# Patient Record
Sex: MALE | Race: WHITE | NOT HISPANIC OR LATINO | Employment: FULL TIME | URBAN - NONMETROPOLITAN AREA
[De-identification: names, ages, dates, MRNs, and addresses within clinical notes are randomized per-mention and may not be internally consistent; named-entity substitution may affect disease eponyms.]

---

## 2017-04-21 ENCOUNTER — OFFICE VISIT (OUTPATIENT)
Dept: MEDICAL GROUP | Facility: PHYSICIAN GROUP | Age: 47
End: 2017-04-21
Payer: COMMERCIAL

## 2017-04-21 ENCOUNTER — TELEPHONE (OUTPATIENT)
Dept: MEDICAL GROUP | Facility: PHYSICIAN GROUP | Age: 47
End: 2017-04-21

## 2017-04-21 VITALS
HEART RATE: 68 BPM | TEMPERATURE: 96.9 F | HEIGHT: 76 IN | OXYGEN SATURATION: 97 % | SYSTOLIC BLOOD PRESSURE: 122 MMHG | BODY MASS INDEX: 29.1 KG/M2 | RESPIRATION RATE: 16 BRPM | DIASTOLIC BLOOD PRESSURE: 82 MMHG | WEIGHT: 239 LBS

## 2017-04-21 DIAGNOSIS — L98.9 SKIN LESION: ICD-10-CM

## 2017-04-21 DIAGNOSIS — I10 ESSENTIAL HYPERTENSION: ICD-10-CM

## 2017-04-21 DIAGNOSIS — R73.09 ELEVATED GLUCOSE: ICD-10-CM

## 2017-04-21 DIAGNOSIS — Z00.00 HEALTH CARE MAINTENANCE: ICD-10-CM

## 2017-04-21 DIAGNOSIS — E03.9 HYPOTHYROIDISM (ACQUIRED): ICD-10-CM

## 2017-04-21 PROCEDURE — 99214 OFFICE O/P EST MOD 30 MIN: CPT | Performed by: NURSE PRACTITIONER

## 2017-04-21 RX ORDER — LOSARTAN POTASSIUM 25 MG/1
25 TABLET ORAL DAILY
Qty: 90 TAB | Refills: 3 | Status: SHIPPED | OUTPATIENT
Start: 2017-04-21

## 2017-04-21 RX ORDER — LEVOTHYROXINE SODIUM 0.05 MG/1
50 TABLET ORAL
Qty: 90 TAB | Refills: 3 | Status: SHIPPED | OUTPATIENT
Start: 2017-04-21 | End: 2018-01-17 | Stop reason: SDUPTHER

## 2017-04-21 NOTE — ASSESSMENT & PLAN NOTE
This is a chronic condition, stable and controlled on lisinopril 10 mg daily. His blood pressure today is 122/82. He denies symptoms of hypertension. He is also on this for renal protection after kidney stones. However, he does not tolerate this medication very well due to side effects including cough. He is wanting to know if there is anything else he can take or if he can decrease the dose to 5 mg daily. We will change him over to losartan, 25 mg daily. Patient was agreeable to this plan. He is to continue with healthy eating, regular physical activity and continued efforts towards weight loss.

## 2017-04-21 NOTE — PATIENT INSTRUCTIONS
Change from lisinopril to losartan 25 mg daily--let's see how you do.    Called in SynthBigDeal, and told pharmacy to dispense name brand only    Have labs done in Sept, after the 24th

## 2017-04-21 NOTE — PROGRESS NOTES
"Chief Complaint   Patient presents with   • Results     fv labs, discuss levothyroxine         This is a 47 y.o.male patient that presents today with the following: Follow-up visit, acute and chronic conditions    Skin lesion  Pt has multiple skin lesions on back that are concerning, they are irregular in shape. Wife has noticed that some of them have changed over the years and there is one on his L scapula that bothers him. Upon examination, patient had multiple skin lesions that appear to be seborrheic keratoses, however he does have one that is half a centimeter in diameter  That is a red and raised nodule on his left scapula that is erythematous, soft--this is the one that bothers him the most.   He is fair skinned and does admit to having a significant sunburn and his adolescent years and would benefit from an overall skin evaluation, thus referral to dermatology was placed.    Hypothyroidism (acquired)  This is a chronic condition, stable and controlled on Synthroid 50 µg daily. His last TSH in September was within normal limits, but on the high end of normal. He has been taking the generic form of this, but states he does not tolerate it very well. He feels that his eyes are dry and almost like they are \"bulging.\" We will call in Synthroid to his pharmacy indicating for them to fill only if the name brand. He'll be due for labs in September, these were ordered. He is to have these done before he follows up with me in October. Otherwise, he does not complain of symptoms of hypothyroidism.    Essential hypertension  This is a chronic condition, stable and controlled on lisinopril 10 mg daily. His blood pressure today is 122/82. He denies symptoms of hypertension. He is also on this for renal protection after kidney stones. However, he does not tolerate this medication very well due to side effects including cough. He is wanting to know if there is anything else he can take or if he can decrease the dose to 5 mg " daily. We will change him over to losartan, 25 mg daily. Patient was agreeable to this plan. He is to continue with healthy eating, regular physical activity and continued efforts towards weight loss.    Health care maintenance  Patient is up-to-date with health care maintenance. He will be due for routine fasting labs in the fall, these were ordered and he is to have these done before he sees me at his follow-up appointment in October. He is up-to-date with immunizations. He will be due for his first lung cancer screening exam next year when he turns 48.    Elevated glucose  With patient's labs in September, it was noted that his glucose is 136. At that time a hemoglobin A1c was ordered and he was to have that done before his appointment today. He did not have this done, this was reordered and I would like him to have this done before his appointment with me in the fall.        No visits with results within 1 Month(s) from this visit.  Latest known visit with results is:    Hospital Outpatient Visit on 09/24/2016   Component Date Value   • Sodium 09/24/2016 138    • Potassium 09/24/2016 4.0    • Chloride 09/24/2016 105    • Co2 09/24/2016 26    • Anion Gap 09/24/2016 7.0    • Glucose 09/24/2016 136*   • Bun 09/24/2016 11    • Creatinine 09/24/2016 0.92    • Calcium 09/24/2016 8.9    • AST(SGOT) 09/24/2016 29    • ALT(SGPT) 09/24/2016 41    • Alkaline Phosphatase 09/24/2016 52    • Total Bilirubin 09/24/2016 0.8    • Albumin 09/24/2016 4.5    • Total Protein 09/24/2016 7.4    • Globulin 09/24/2016 2.9    • A-G Ratio 09/24/2016 1.6    • Cholesterol,Tot 09/24/2016 180    • Triglycerides 09/24/2016 169*   • HDL 09/24/2016 44    • LDL 09/24/2016 102*   • TSH 09/24/2016 3.140    • GFR If  09/24/2016 >60    • GFR If Non  Ameri* 09/24/2016 >60          clinical course has been stable    Past Medical History   Diagnosis Date   • Hypertension    • Hypothyroidism (acquired)    • Chronic sinusitis   "      Past Surgical History   Procedure Laterality Date   • Rhinoplasty  2000   • Nasal reconstruction  2013   • Hand surgery  1997, 1998       Family History   Problem Relation Age of Onset   • Diabetes Father    • Hypertension Father    • Thyroid Mother    • Kidney stones Father      Gout as well   • Hypertension Mother        Pcn    Current Outpatient Prescriptions Ordered in Psychiatric   Medication Sig Dispense Refill   • losartan (COZAAR) 25 MG Tab Take 1 Tab by mouth every day. 90 Tab 3   • levothyroxine (SYNTHROID) 50 MCG Tab Take 1 Tab by mouth Every morning on an empty stomach. 90 Tab 3   • albuterol 108 (90 BASE) MCG/ACT Aero Soln inhalation aerosol Inhale 2 Puffs by mouth every 6 hours as needed for Shortness of Breath. 8.5 g 3   • fluticasone (FLONASE) 50 MCG/ACT nasal spray Spray 1 Spray in nose 2 times a day. 1 Bottle 0     No current Epic-ordered facility-administered medications on file.       Constitutional ROS: No unexpected change in weight, No weakness, No unexplained fevers, sweats, or chills  Pulmonary ROS: No chronic cough, sputum, or hemoptysis, No shortness of breath, No recent change in breathing  Cardiovascular ROS: No shortness of breath, No edema, No palpitations, Positive for hypertension, well-controlled  Gastrointestinal ROS: No abdominal pain, No nausea, vomiting, diarrhea, or constipation, no blood in stool  Musculoskeletal/Extremities ROS: No clubbing, No cyanosis, No pain, redness or swelling on the joints  Skin/Integumentary ROS: Positive per history of present illness  Neurologic ROS: Normal development, No seizures, No weakness    Physical exam:  /82 mmHg  Pulse 68  Temp(Src) 36.1 °C (96.9 °F)  Resp 16  Ht 1.93 m (6' 3.98\")  Wt 108.41 kg (239 lb)  BMI 29.10 kg/m2  SpO2 97%  General Appearance: Middle-aged male, alert, no distress, moderately overweight, well-groomed   Skin: Positive for multiple seborrheic keratoses of varying size and color to the back, half millimeter " raised, red nodule to left scapula  Lungs: negative findings: normal respiratory rate and rhythm, lungs clear to auscultation  Heart: negative. RRR without murmur, gallop, or rubs.  No ectopy.  Abdomen: Abdomen soft, non-tender. BS normal. No masses,  No organomegaly  Musculoskeletal: negative  Neuro: Intact, Oriented, appropriate, judgment intact. Cranial nerves II-12 grossly intact    Medical decision making/discussion: Patient here for follow-up visit. Referral was placed to dermatology for overall skin check and multiple lesions to back. He is to have labs done in the fall before he sees me in follow-up. Medications were refilled and blood pressure medication was changed from lisinopril to losartan. He is to work on healthy diet, regular physical activity and continued efforts towards weight loss. We will check a hemoglobin A1c for elevated blood glucose.    Patrick was seen today for results.    Diagnoses and all orders for this visit:    Essential hypertension  -     losartan (COZAAR) 25 MG Tab; Take 1 Tab by mouth every day.  -     COMP METABOLIC PANEL; Future  -     LIPID PROFILE; Future    Hypothyroidism (acquired)  -     levothyroxine (SYNTHROID) 50 MCG Tab; Take 1 Tab by mouth Every morning on an empty stomach.  -     TSH WITH REFLEX TO FT4; Future    Skin lesion  -     REFERRAL TO DERMATOLOGY    Health care maintenance    Elevated glucose  -     HEMOGLOBIN A1C; Future          Please note that this dictation was created using voice recognition software. I have made every reasonable attempt to correct obvious errors, but I expect that there are errors of grammar and possibly content that I did not discover before finalizing the note.

## 2017-04-21 NOTE — ASSESSMENT & PLAN NOTE
Patient is up-to-date with health care maintenance. He will be due for routine fasting labs in the fall, these were ordered and he is to have these done before he sees me at his follow-up appointment in October. He is up-to-date with immunizations. He will be due for his first lung cancer screening exam next year when he turns 48.

## 2017-04-21 NOTE — ASSESSMENT & PLAN NOTE
Pt has multiple skin lesions on back that are concerning, they are irregular in shape. Wife has noticed that some of them have changed over the years and there is one on his L scapula that bothers him. Upon examination, patient had multiple skin lesions that appear to be seborrheic keratoses, however he does have one that is half a centimeter in diameter  That is a red and raised nodule on his left scapula that is erythematous, soft--this is the one that bothers him the most.   He is fair skinned and does admit to having a significant sunburn and his adolescent years and would benefit from an overall skin evaluation, thus referral to dermatology was placed.

## 2017-04-21 NOTE — MR AVS SNAPSHOT
"        Patrick Robertscheyenne   2017 8:00 AM   Office Visit   MRN: [id number]    Department:  [facility]   Dept Phone:  [phone]    Description:  Male : 1970   Provider:  DAVION Mcginnis           Reason for Visit     Results fv labs, discuss levothyroxine      Allergies as of 2017     Allergen Noted Reactions    Pcn [Penicillins] 2016   Anaphylaxis      You were diagnosed with     Essential hypertension   [6344487]       Hypothyroidism (acquired)   [906010]       Skin lesion   [428448]         Vital Signs     Blood Pressure Pulse Temperature Respirations Height Weight    122/82 mmHg 68 36.1 °C (96.9 °F) 16 1.93 m (6' 3.98\") 108.41 kg (239 lb)    Body Mass Index Oxygen Saturation Smoking Status             29.10 kg/m2 97% Former Smoker         Basic Information     Date Of Birth Sex Race Ethnicity Preferred Language    1970 Male White Non- English      Your appointments     Oct 06, 2017  6:15 AM   Adult Draw/Collection with LAB [facility]   [facility] (--)    [address] [place] [address] [address] [place] [address]   [phone]            Oct 20, 2017  9:20 AM   Established Patient with DAVION Mcginins   [address] [place] [id number] [phone]           You will be receiving a confirmation call a few days before your appointment from our automated call confirmation system.              Problem List              ICD-10-CM Priority Class Noted - Resolved    Allergic rhinitis due to pollen J30.1   2016 - Present    Chronic sinusitis J32.9   2016 - Present    Shortness of breath R06.02   2016 - Present    Hypothyroidism (acquired) E03.9   2016 - Present    Essential hypertension I10   2016 - Present    Skin lesion L98.9   2017 - Present      Health Maintenance        Date Due Completion Dates    COLON CANCER SCREENING ANNUAL FIT 2017 (Postponed), 2016 " (Postponed)    Override on 9/23/2016: Postponed (not due until age 50)    Override on 9/23/2016: Postponed (not  due unit 50 years old)    IMM DTaP/Tdap/Td Vaccine (2 - Td) 4/23/2026 4/23/2016            Current Immunizations     Tdap Vaccine 4/23/2016      Below and/or attached are the medications your provider expects you to take. Review all of your home medications and newly ordered medications with your provider and/or pharmacist. Follow medication instructions as directed by your provider and/or pharmacist. Please keep your medication list with you and share with your provider. Update the information when medications are discontinued, doses are changed, or new medications (including over-the-counter products) are added; and carry medication information at all times in the event of emergency situations     Allergies:  PCN - Anaphylaxis               Medications  Valid as of: April 21, 2017 -  8:28 AM    Generic Name Brand Name Tablet Size Instructions for use    Albuterol Sulfate (Aero Soln) albuterol 108 (90 BASE) MCG/ACT Inhale 2 Puffs by mouth every 6 hours as needed for Shortness of Breath.        Fluticasone Propionate (Suspension) FLONASE 50 MCG/ACT Spray 1 Spray in nose 2 times a day.        Levothyroxine Sodium (Tab) SYNTHROID 50 MCG Take 1 Tab by mouth Every morning on an empty stomach.        Losartan Potassium (Tab) COZAAR 25 MG Take 1 Tab by mouth every day.        .                 Medicines prescribed today were sent to:     [facility] [id number] [facility] [address] AT [facility] [address] & [address] [facility] [address]    Phone: [phone] Fax: [phone]    Open 24 Hours?: No      Medication refill instructions:       If your prescription bottle indicates you have medication refills left, it is not necessary to call your provider’s office. Please contact your pharmacy and they will refill your medication.    If your prescription bottle indicates  you do not have any refills left, you may request refills at any time through one of the following ways: The online [employer] system (except Urgent Care), by calling your provider’s office, or by asking your pharmacy to contact your provider’s office with a refill request. Medication refills are processed only during regular business hours and may not be available until the next business day. Your provider may request additional information or to have a follow-up visit with you prior to refilling your medication.   *Please Note: Medication refills are assigned a new Rx number when refilled electronically. Your pharmacy may indicate that no refills were authorized even though a new prescription for the same medication is available at the pharmacy. Please request the medicine by name with the pharmacy before contacting your provider for a refill.        Your To Do List     Future Labs/Procedures Complete By Expires    COMP METABOLIC PANEL  As directed 4/21/2018    LIPID PROFILE  As directed 4/21/2018    TSH WITH REFLEX TO FT4  As directed 4/21/2018      Referral     A referral request has been sent to our patient care coordination department. Please allow 3-5 business days for us to process this request and contact you either by phone or mail. If you do not hear from us by the 5th business day, please call us at [phone].        Instructions    Change from lisinopril to losartan 25 mg daily--let's see how you do.    Called in Synthroid, and told pharmacy to dispense name brand only    Have labs done in Sept, after the 24th              [employer] Access Code: [email or web address]  Expires: 5/21/2017  8:17 AM    [employer]  A secure, online tool to manage your health information     [employer]’s [employer]® is a secure, online tool that connects you to your personalized health information from the privacy of your home -- day or night - making it very easy for you to manage your healthcare. Once the activation process is  completed, you can even access your medical information using the Alaris cali, which is available for free in the Apple Cali store or Google Play store.     Alaris provides the following levels of access (as shown below):   My Chart Features   [facility] Doctor [facility]  Specialists [facility]  Urgent  Care Non-[facility]  Primary Care  Doctor   Email your healthcare team securely and privately 24/7 X X X    Manage appointments: schedule your next appointment; view details of past/upcoming appointments X      Request prescription refills. X      View recent personal medical records, including lab and immunizations X X X X   View health record, including health history, allergies, medications X X X X   Read reports about your outpatient visits, procedures, consult and ER notes X X X X   See your discharge summary, which is a recap of your hospital and/or ER visit that includes your diagnosis, lab results, and care plan. X X       How to register for Alaris:  1. Go to  [email or web address].  2. Click on the Sign Up Now box, which takes you to the New Member Sign Up page. You will need to provide the following information:  a. Enter your Alaris Access Code exactly as it appears at the top of this page. (You will not need to use this code after you’ve completed the sign-up process. If you do not sign up before the expiration date, you must request a new code.)   b. Enter your date of birth.   c. Enter your home email address.   d. Click Submit, and follow the next screen’s instructions.  3. Create a Alaris ID. This will be your Alaris login ID and cannot be changed, so think of one that is secure and easy to remember.  4. Create a Alaris password. You can change your password at any time.  5. Enter your Password Reset Question and Answer. This can be used at a later time if you forget your password.   6. Enter your e-mail address. This allows you to receive e-mail notifications when new information is  available in [employer] Computing. Click Sign Up. You can now view your health information.    For assistance activating your [employer] account, call [phone]        Quit Tobacco Information     Do you want to quit using tobacco?    Quitting tobacco decreases risks of cancer, heart and lung disease, increases life expectancy, improves sense of taste and smell, and increases spending money, among other benefits.    If you are thinking about quitting, we can help.  • [facility] Quit Tobacco Program: 804.986.6062  o Program occurs weekly for four weeks and includes pharmacist consultation on products to support quitting smoking or chewing tobacco. A provider referral is needed for pharmacist consultation.  • Tobacco Users Help Hotline: 9-800QUIT-NOW (541-7840) or https://[place].quitlogix.org/  o Free, confidential telephone and online coaching for [place] residents. Sessions are designed on a schedule that is convenient for you. Eligible clients receive free nicotine replacement therapy.  • Nationally: www.smokefree.gov  o Information and professional assistance to support both immediate and long-term needs as you become, and remain, a non-smoker. Smokefree.gov allows you to choose the help that best fits your needs.

## 2017-04-21 NOTE — ASSESSMENT & PLAN NOTE
"This is a chronic condition, stable and controlled on Synthroid 50 µg daily. His last TSH in September was within normal limits, but on the high end of normal. He has been taking the generic form of this, but states he does not tolerate it very well. He feels that his eyes are dry and almost like they are \"bulging.\" We will call in Synthroid to his pharmacy indicating for them to fill only if the name brand. He'll be due for labs in September, these were ordered. He is to have these done before he follows up with me in October. Otherwise, he does not complain of symptoms of hypothyroidism.  "

## 2017-04-21 NOTE — ASSESSMENT & PLAN NOTE
With patient's labs in September, it was noted that his glucose is 136. At that time a hemoglobin A1c was ordered and he was to have that done before his appointment today. He did not have this done, this was reordered and I would like him to have this done before his appointment with me in the fall.

## 2017-04-21 NOTE — TELEPHONE ENCOUNTER
Please let patient know that while I was finishing up his chart I was reviewing his last labs and realized that he did not have the follow-up labs I ordered because of his elevated glucose in September. I would really like him to have these done in the next couple weeks. However, I know he is very busy and if he would like to have them done with his next labs that will be ok.

## 2017-06-30 ENCOUNTER — OFFICE VISIT (OUTPATIENT)
Dept: URGENT CARE | Facility: PHYSICIAN GROUP | Age: 47
End: 2017-06-30
Payer: COMMERCIAL

## 2017-06-30 VITALS
SYSTOLIC BLOOD PRESSURE: 124 MMHG | WEIGHT: 286.6 LBS | DIASTOLIC BLOOD PRESSURE: 98 MMHG | HEART RATE: 84 BPM | OXYGEN SATURATION: 95 % | HEIGHT: 74 IN | TEMPERATURE: 98.1 F | BODY MASS INDEX: 36.78 KG/M2

## 2017-06-30 DIAGNOSIS — H68.011 EUSTACHIAN SALPINGITIS, ACUTE, RIGHT: ICD-10-CM

## 2017-06-30 PROCEDURE — 99214 OFFICE O/P EST MOD 30 MIN: CPT | Performed by: FAMILY MEDICINE

## 2017-06-30 RX ORDER — AZITHROMYCIN 250 MG/1
TABLET, FILM COATED ORAL
Qty: 6 TAB | Refills: 0 | Status: SHIPPED | OUTPATIENT
Start: 2017-06-30 | End: 2018-06-01

## 2017-06-30 ASSESSMENT — ENCOUNTER SYMPTOMS
HEADACHES: 0
SORE THROAT: 1

## 2017-06-30 NOTE — PROGRESS NOTES
"Subjective:      Patrick Livingston is a 47 y.o. male who presents with Otalgia            Otalgia   There is pain in the right ear. This is a new problem. The current episode started in the past 7 days. The problem occurs constantly. The problem has been gradually worsening. There has been no fever. The pain is moderate. Associated symptoms include a sore throat. Pertinent negatives include no ear discharge, headaches or hearing loss.       Review of Systems   HENT: Positive for ear pain and sore throat. Negative for ear discharge and hearing loss.    Neurological: Negative for headaches.     Allergies   Allergen Reactions   • Pcn [Penicillins] Anaphylaxis         Objective:     /98 mmHg  Pulse 84  Temp(Src) 36.7 °C (98.1 °F)  Ht 1.88 m (6' 2.02\")  Wt 130.001 kg (286 lb 9.6 oz)  BMI 36.78 kg/m2  SpO2 95%     Physical Exam   Constitutional: He is oriented to person, place, and time. He appears well-developed and well-nourished. No distress.   HENT:   Head: Normocephalic and atraumatic.   Mouth/Throat: Uvula is midline. Posterior oropharyngeal edema and posterior oropharyngeal erythema present. No oropharyngeal exudate or tonsillar abscesses.   Eyes: Conjunctivae and EOM are normal. Pupils are equal, round, and reactive to light.   Cardiovascular: Normal rate, regular rhythm and intact distal pulses.    No murmur heard.  Pulmonary/Chest: Effort normal and breath sounds normal. No respiratory distress.   Abdominal: Soft. He exhibits no distension. There is no tenderness.   Neurological: He is alert and oriented to person, place, and time. He has normal reflexes. No sensory deficit.   Skin: Skin is warm and dry.   Psychiatric: He has a normal mood and affect. His behavior is normal.   Vitals reviewed.              Assessment/Plan:     1. Eustachian salpingitis, acute, right  Differential diagnosis, natural history, supportive care, and indications for immediate follow-up discussed.   - azithromycin " (ZITHROMAX) 250 MG Tab; Take 2 tablets by mouth on day one. Take one tablet by mouth the remaining days until gone  Dispense: 6 Tab; Refill: 0

## 2017-06-30 NOTE — MR AVS SNAPSHOT
"        Patrick Robertscheyenne   2017 9:05 AM   Office Visit   MRN: [id number]    Department:  [facility]   Dept Phone:  [phone]    Description:  Male : 1970   Provider:  Cas Ring M.D.           Reason for Visit     Otalgia both ears x1 week      Allergies as of 2017     Allergen Noted Reactions    Pcn [Penicillins] 2016   Anaphylaxis      You were diagnosed with     Eustachian salpingitis, acute, right   [[id number]]         Vital Signs     Blood Pressure Pulse Temperature Height Weight Body Mass Index    124/98 mmHg 84 36.7 °C (98.1 °F) 1.88 m (6' 2.02\") 130.001 kg (286 lb 9.6 oz) 36.78 kg/m2    Oxygen Saturation Smoking Status                95% Never Smoker           Basic Information     Date Of Birth Sex Race Ethnicity Preferred Language    1970 Male White Non- English      Your appointments     Oct 06, 2017  6:15 AM   Adult Draw/Collection with [facility] (--)    [address] [place] [address] [address] [place] [address]   [phone]            Oct 20, 2017  9:20 AM   Established Patient with DAVION Mcginnis   [facility]    [address] [place] [address]  [place] [address] [phone]           You will be receiving a confirmation call a few days before your appointment from our automated call confirmation system.              Problem List              ICD-10-CM Priority Class Noted - Resolved    Allergic rhinitis due to pollen J30.1   2016 - Present    Chronic sinusitis J32.9   2016 - Present    Shortness of breath R06.02   2016 - Present    Hypothyroidism (acquired) E03.9   2016 - Present    Essential hypertension I10   2016 - Present    Skin lesion L98.9   2017 - Present    Health care maintenance Z00.00   2017 - Present    Elevated glucose R73.09   2017 - Present      Health Maintenance        Date Due Completion Dates    [employer]ON CANCER SCREENING ANNUAL FIT 2017 " (Postponed), 9/23/2016 (Postponed)    Override on 9/23/2016: Postponed (not due until age 50)    Override on 9/23/2016: Postponed (not  due unit 50 years old)    IMM DTaP/Tdap/Td Vaccine (2 - Td) 4/23/2026 4/23/2016            Current Immunizations     Tdap Vaccine 4/23/2016      Below and/or attached are the medications your provider expects you to take. Review all of your home medications and newly ordered medications with your provider and/or pharmacist. Follow medication instructions as directed by your provider and/or pharmacist. Please keep your medication list with you and share with your provider. Update the information when medications are discontinued, doses are changed, or new medications (including over-the-counter products) are added; and carry medication information at all times in the event of emergency situations     Allergies:  PCN - Anaphylaxis               Medications  Valid as of: June 30, 2017 - 10:01 AM    Generic Name Brand Name Tablet Size Instructions for use    Albuterol Sulfate (Aero Soln) albuterol 108 (90 BASE) MCG/ACT Inhale 2 Puffs by mouth every 6 hours as needed for Shortness of Breath.        Azithromycin (Tab) ZITHROMAX 250 MG Take 2 tablets by mouth on day one. Take one tablet by mouth the remaining days until gone        Fexofenadine HCl   Take  by mouth.        Fluticasone Propionate (Suspension) FLONASE 50 MCG/ACT Spray 1 Spray in nose 2 times a day.        Levothyroxine Sodium (Tab) SYNTHROID 50 MCG Take 1 Tab by mouth Every morning on an empty stomach.        Losartan Potassium (Tab) COZAAR 25 MG Take 1 Tab by mouth every day.        .                 Medicines prescribed today were sent to:     [id number] - [place], [place] - [address] [facility] [address] [facility] [address]    [address] [address] [facility] [place] [address]    Phone: [phone] Fax: [phone]    Open 24 Hours?: No      Medication refill instructions:       If your prescription bottle  indicates you have medication refills left, it is not necessary to call your provider’s office. Please contact your pharmacy and they will refill your medication.    If your prescription bottle indicates you do not have any refills left, you may request refills at any time through one of the following ways: The online Real Time Tomography system (except Urgent Care), by calling your provider’s office, or by asking your pharmacy to contact your provider’s office with a refill request. Medication refills are processed only during regular business hours and may not be available until the next business day. Your provider may request additional information or to have a follow-up visit with you prior to refilling your medication.   *Please Note: Medication refills are assigned a new Rx number when refilled electronically. Your pharmacy may indicate that no refills were authorized even though a new prescription for the same medication is available at the pharmacy. Please request the medicine by name with the pharmacy before contacting your provider for a refill.        Instructions    Otalgia  Otalgia is pain in or around the ear. When the pain is from the ear itself it is called primary otalgia. Pain may also be coming from somewhere else, like the head and neck. This is called secondary otalgia.   CAUSES   Causes of primary otalgia include:  · Middle ear infection.  · It can also be caused by injury to the ear or infection of the ear canal (swimmer's ear). Swimmer's ear causes pain, swelling and often drainage from the ear canal.  Causes of secondary otalgia include:  · Sinus infections.  · Allergies and colds that cause stuffiness of the nose and tubes that drain the ears (eustachian tubes).  · Dental problems like cavities, gum infections or teething.  · Sore Throat (tonsillitis and pharyngitis).  · Swollen glands in the neck.  · Infection of the bone behind the ear (mastoiditis).  · TMJ discomfort (problems with the joint between  your jaw and your skull).  · Other problems such as nerve disorders, circulation problems, heart disease and tumors of the head and neck can also cause symptoms of ear pain. This is rare.  DIAGNOSIS   Evaluation, Diagnosis and Testing:  · Examination by your medical caregiver is recommended to evaluate and diagnose the cause of otalgia.  · Further testing or referral to a specialist may be indicated if the cause of the ear pain is not found and the symptom persists.  TREATMENT   · Your doctor may prescribe antibiotics if an ear infection is diagnosed.  · Pain relievers and topical analgesics may be recommended.  · It is important to take all medications as prescribed.  HOME CARE INSTRUCTIONS   · It may be helpful to sleep with the painful ear in the up position.  · A warm compress over the painful ear may provide relief.  · A soft diet and avoiding gum may help while ear pain is present.  SEEK IMMEDIATE MEDICAL CARE IF:  · You develop severe pain, a high fever, repeated vomiting or dehydration.  · You develop extreme dizziness, headache, confusion, ringing in the ears (tinnitus) or hearing loss.  Document Released: 01/25/2006 Document Revised: 03/11/2013 Document Reviewed: 10/27/2010  [employer]® Patient Information ©2014 "[employer]".            MyChart Status: Patient Declined        Quit Tobacco Information     Do you want to quit using tobacco?    Quitting tobacco decreases risks of cancer, heart and lung disease, increases life expectancy, improves sense of taste and smell, and increases spending money, among other benefits.    If you are thinking about quitting, we can help.  • [facility] Quit Tobacco Program: [phone]  o Program occurs weekly for four weeks and includes pharmacist consultation on products to support quitting smoking or chewing tobacco. A provider referral is needed for pharmacist consultation.  • Tobacco Users Help Hotline: 5-590-QUIT-NOW (306-2914) or https://[place].quitlogix.org/  o Free,  confidential telephone and online coaching for [place] residents. Sessions are designed on a schedule that is convenient for you. Eligible clients receive free nicotine replacement therapy.  • Nationally: www.smokefree.gov  o Information and professional assistance to support both immediate and long-term needs as you become, and remain, a non-smoker. Smokefree.gov allows you to choose the help that best fits your needs.

## 2017-06-30 NOTE — PATIENT INSTRUCTIONS
Otalgia  Otalgia is pain in or around the ear. When the pain is from the ear itself it is called primary otalgia. Pain may also be coming from somewhere else, like the head and neck. This is called secondary otalgia.   CAUSES   Causes of primary otalgia include:  · Middle ear infection.  · It can also be caused by injury to the ear or infection of the ear canal (swimmer's ear). Swimmer's ear causes pain, swelling and often drainage from the ear canal.  Causes of secondary otalgia include:  · Sinus infections.  · Allergies and colds that cause stuffiness of the nose and tubes that drain the ears (eustachian tubes).  · Dental problems like cavities, gum infections or teething.  · Sore Throat (tonsillitis and pharyngitis).  · Swollen glands in the neck.  · Infection of the bone behind the ear (mastoiditis).  · TMJ discomfort (problems with the joint between your jaw and your skull).  · Other problems such as nerve disorders, circulation problems, heart disease and tumors of the head and neck can also cause symptoms of ear pain. This is rare.  DIAGNOSIS   Evaluation, Diagnosis and Testing:  · Examination by your medical caregiver is recommended to evaluate and diagnose the cause of otalgia.  · Further testing or referral to a specialist may be indicated if the cause of the ear pain is not found and the symptom persists.  TREATMENT   · Your doctor may prescribe antibiotics if an ear infection is diagnosed.  · Pain relievers and topical analgesics may be recommended.  · It is important to take all medications as prescribed.  HOME CARE INSTRUCTIONS   · It may be helpful to sleep with the painful ear in the up position.  · A warm compress over the painful ear may provide relief.  · A soft diet and avoiding gum may help while ear pain is present.  SEEK IMMEDIATE MEDICAL CARE IF:  · You develop severe pain, a high fever, repeated vomiting or dehydration.  · You develop extreme dizziness, headache, confusion, ringing in the  ears (tinnitus) or hearing loss.  Document Released: 01/25/2006 Document Revised: 03/11/2013 Document Reviewed: 10/27/2010  [employer]® Patient Information ©2014 [employer].

## 2017-10-13 ENCOUNTER — HOSPITAL ENCOUNTER (OUTPATIENT)
Dept: LAB | Facility: MEDICAL CENTER | Age: 47
End: 2017-10-13
Attending: NURSE PRACTITIONER
Payer: COMMERCIAL

## 2017-10-13 DIAGNOSIS — E03.9 HYPOTHYROIDISM (ACQUIRED): ICD-10-CM

## 2017-10-13 DIAGNOSIS — R73.09 ELEVATED GLUCOSE: ICD-10-CM

## 2017-10-13 DIAGNOSIS — I10 ESSENTIAL HYPERTENSION: ICD-10-CM

## 2017-10-13 LAB
ALBUMIN SERPL BCP-MCNC: 4.8 G/DL (ref 3.2–4.9)
ALBUMIN/GLOB SERPL: 1.7 G/DL
ALP SERPL-CCNC: 67 U/L (ref 30–99)
ALT SERPL-CCNC: 53 U/L (ref 2–50)
ANION GAP SERPL CALC-SCNC: 8 MMOL/L (ref 0–11.9)
AST SERPL-CCNC: 33 U/L (ref 12–45)
BILIRUB SERPL-MCNC: 0.6 MG/DL (ref 0.1–1.5)
BUN SERPL-MCNC: 11 MG/DL (ref 8–22)
CALCIUM SERPL-MCNC: 9.4 MG/DL (ref 8.5–10.5)
CHLORIDE SERPL-SCNC: 103 MMOL/L (ref 96–112)
CHOLEST SERPL-MCNC: 186 MG/DL (ref 100–199)
CO2 SERPL-SCNC: 27 MMOL/L (ref 20–33)
CREAT SERPL-MCNC: 0.8 MG/DL (ref 0.5–1.4)
EST. AVERAGE GLUCOSE BLD GHB EST-MCNC: 126 MG/DL
GFR SERPL CREATININE-BSD FRML MDRD: >60 ML/MIN/1.73 M 2
GLOBULIN SER CALC-MCNC: 2.9 G/DL (ref 1.9–3.5)
GLUCOSE SERPL-MCNC: 93 MG/DL (ref 65–99)
HBA1C MFR BLD: 6 % (ref 0–5.6)
HDLC SERPL-MCNC: 49 MG/DL
LDLC SERPL CALC-MCNC: 114 MG/DL
POTASSIUM SERPL-SCNC: 4.1 MMOL/L (ref 3.6–5.5)
PROT SERPL-MCNC: 7.7 G/DL (ref 6–8.2)
SODIUM SERPL-SCNC: 138 MMOL/L (ref 135–145)
TRIGL SERPL-MCNC: 114 MG/DL (ref 0–149)
TSH SERPL DL<=0.005 MIU/L-ACNC: 2.73 UIU/ML (ref 0.3–3.7)

## 2017-10-13 PROCEDURE — 84443 ASSAY THYROID STIM HORMONE: CPT

## 2017-10-13 PROCEDURE — 80053 COMPREHEN METABOLIC PANEL: CPT

## 2017-10-13 PROCEDURE — 80061 LIPID PANEL: CPT

## 2017-10-13 PROCEDURE — 36415 COLL VENOUS BLD VENIPUNCTURE: CPT

## 2017-10-13 PROCEDURE — 83036 HEMOGLOBIN GLYCOSYLATED A1C: CPT

## 2017-10-20 ENCOUNTER — TELEPHONE (OUTPATIENT)
Dept: MEDICAL GROUP | Facility: PHYSICIAN GROUP | Age: 47
End: 2017-10-20

## 2017-10-20 ENCOUNTER — OFFICE VISIT (OUTPATIENT)
Dept: MEDICAL GROUP | Facility: PHYSICIAN GROUP | Age: 47
End: 2017-10-20
Payer: COMMERCIAL

## 2017-10-20 VITALS
SYSTOLIC BLOOD PRESSURE: 126 MMHG | RESPIRATION RATE: 16 BRPM | OXYGEN SATURATION: 98 % | WEIGHT: 261 LBS | DIASTOLIC BLOOD PRESSURE: 80 MMHG | BODY MASS INDEX: 33.5 KG/M2 | TEMPERATURE: 97.7 F | HEIGHT: 74 IN | HEART RATE: 76 BPM

## 2017-10-20 DIAGNOSIS — Z00.00 HEALTH CARE MAINTENANCE: ICD-10-CM

## 2017-10-20 DIAGNOSIS — E03.9 HYPOTHYROIDISM (ACQUIRED): ICD-10-CM

## 2017-10-20 DIAGNOSIS — I10 ESSENTIAL HYPERTENSION: ICD-10-CM

## 2017-10-20 PROCEDURE — 99213 OFFICE O/P EST LOW 20 MIN: CPT | Performed by: NURSE PRACTITIONER

## 2017-10-20 ASSESSMENT — PATIENT HEALTH QUESTIONNAIRE - PHQ9: CLINICAL INTERPRETATION OF PHQ2 SCORE: 0

## 2017-10-20 NOTE — ASSESSMENT & PLAN NOTE
This is a chronic condition, stable and fairly well controlled on losartan 25 mg daily. Blood pressure today is 126/80 and he denies symptoms of hypertension. He tolerates his medication well with no significant bothersome side effects. He does not need refills at this time, but can call when needed.

## 2017-10-20 NOTE — TELEPHONE ENCOUNTER
Please let patient know that I spoke with endocrinologist today. He would like me to check baseline labs including thyroid antibodies to make sure that your hypothyroidism is not caused by Hashimoto's thyroiditis. I have ordered this. He suggests that we have you decreased down to 25 µg daily and repeat labs. If TSH is within normal limits we can go ahead and stop and recheck again in about a month and then again in 2-3 more months.

## 2017-10-20 NOTE — PATIENT INSTRUCTIONS
Stay off Synthroid for next 6 weeks to 2 months--labs after    Follow up with me in 3 months, sooner if needed

## 2017-10-20 NOTE — PROGRESS NOTES
Chief Complaint   Patient presents with   • Thyroid Problem     fv labs, discuss levothyroxine-caused problem with eyes         This is a 47 y.o.male patient that presents today with the following:Review labs, discuss acute and chronic conditions    Hypothyroidism (acquired)  This is a chronic condition, stable and usually well controlled on Synthroid 50 µg daily. His most recent TSH with reflexive T4 were within normal limits. He does tell me that he has stopped the medication about 2 weeks ago due to a sensation of fullness in bilateral eyes as well as mild orbital swelling. He stopped medication and noticed that the symptoms nearly resolved. He states this has happened to him before, but this was due to him taking the generic form of the medication. He has been on brand name.   He would like to stay off the medication for the next 6 weeks to 2 months to see how he does off of it. He is wondering if this can be reversed if he is no longer exposed to the chemicals that have been thought to cause his hypothyroidism. He states he has been exposed to a wood preservative that has been known to cause hypothyroidism. I will discuss with one of our endocrinologist and get back to him.  He is going to have labs done in 2 months, and follow-up with me in 3 months. He is to restart medication should symptoms of hypothyroidism returned.    Essential hypertension  This is a chronic condition, stable and fairly well controlled on losartan 25 mg daily. Blood pressure today is 126/80 and he denies symptoms of hypertension. He tolerates his medication well with no significant bothersome side effects. He does not need refills at this time, but can call when needed.    Health care maintenance  Patient is up-to-date with labs. He declines flu shot. He is up-to-date with TdaP.      Hospital Outpatient Visit on 10/13/2017   Component Date Value   • Sodium 10/13/2017 138    • Potassium 10/13/2017 4.1    • Chloride 10/13/2017 103    •  Co2 10/13/2017 27    • Anion Gap 10/13/2017 8.0    • Glucose 10/13/2017 93    • Bun 10/13/2017 11    • Creatinine 10/13/2017 0.80    • Calcium 10/13/2017 9.4    • AST(SGOT) 10/13/2017 33    • ALT(SGPT) 10/13/2017 53*   • Alkaline Phosphatase 10/13/2017 67    • Total Bilirubin 10/13/2017 0.6    • Albumin 10/13/2017 4.8    • Total Protein 10/13/2017 7.7    • Globulin 10/13/2017 2.9    • A-G Ratio 10/13/2017 1.7    • Cholesterol,Tot 10/13/2017 186    • Triglycerides 10/13/2017 114    • HDL 10/13/2017 49    • LDL 10/13/2017 114*   • TSH 10/13/2017 2.730    • Glycohemoglobin 10/13/2017 6.0*   • Est Avg Glucose 10/13/2017 126    • GFR If  10/13/2017 >60    • GFR If Non  Ameri* 10/13/2017 >60          clinical course has been stable    Past Medical History:   Diagnosis Date   • Chronic sinusitis    • Hypertension    • Hypothyroidism (acquired)        Past Surgical History:   Procedure Laterality Date   • NASAL RECONSTRUCTION  2013   • RHINOPLASTY  2000   • HAND SURGERY  1997, 1998       Family History   Problem Relation Age of Onset   • Diabetes Father    • Hypertension Father    • Kidney stones Father      Gout as well   • Thyroid Mother    • Hypertension Mother        Pcn [penicillins]    Current Outpatient Prescriptions Ordered in [facility]   Medication Sig Dispense Refill   • Fexofenadine HCl (ALLEGRA PO) Take  by mouth.     • losartan (COZAAR) 25 MG Tab Take 1 Tab by mouth every day. 90 Tab 3   • azithromycin (ZITHROMAX) 250 MG Tab Take 2 tablets by mouth on day one. Take one tablet by mouth the remaining days until gone 6 Tab 0   • levothyroxine (SYNTHROID) 50 MCG Tab Take 1 Tab by mouth Every morning on an empty stomach. 90 Tab 3   • albuterol 108 (90 BASE) MCG/ACT Aero Soln inhalation aerosol Inhale 2 Puffs by mouth every 6 hours as needed for Shortness of Breath. 8.5 g 3   • fluticasone (FLONASE) 50 MCG/ACT nasal spray Spray 1 Spray in nose 2 times a day. 1 Bottle 0     No current Epic-ordered  "facility-administered medications on file.        Constitutional ROS: No unexpected change in weight, No weakness, No unexplained fevers, sweats, or chills  Eye ROS: Positive per history of present illness  Pulmonary ROS: No chronic cough, sputum, or hemoptysis, No shortness of breath, No recent change in breathing  Cardiovascular ROS: No chest pain, No edema, No palpitations, Positive for hypertension, controlled  Gastrointestinal ROS: No abdominal pain, No nausea, vomiting, diarrhea, or constipation, no blood in stool  Musculoskeletal/Extremities ROS: No clubbing, No peripheral edema, No pain, redness or swelling on the joints  Neurologic ROS: Normal development, No seizures, No weakness  Endocrine ROS: Positive per history of present illness    Physical exam:  /80   Pulse 76   Temp 36.5 °C (97.7 °F)   Resp 16   Ht 1.88 m (6' 2\")   Wt 118.4 kg (261 lb)   SpO2 98%   BMI 33.51 kg/m²   General Appearance: Middle-age male, alert, no distress, obese, well-groomed  Skin: Skin color, texture, turgor normal. No rashes or lesions.  Lungs: negative findings: normal respiratory rate and rhythm, lungs clear to auscultation  Heart: negative. RRR without murmur, gallop, or rubs.  No ectopy.  Abdomen: Abdomen soft, non-tender. BS normal. No masses,  No organomegaly  Musculoskeletal: negative findings: ROM of all joints is normal, no evidence of joint instability, strength normal, no deformities present  Neurologic: intact, oriented, mood appropriate, judgment intact. Cranial nerves II-12 grossly intact     Medical decision making/discussion: We'll check labs and if thyroid antibodies are normal, I will have him decrease Synthroid down to 25 µg daily and we will recheck labs in 6 weeks to 2 months. I would like him to follow-up with me in 3 months, sooner if needed.    Patrick was seen today for thyroid problem.    Diagnoses and all orders for this visit:    Hypothyroidism (acquired)  -     TSH WITH REFLEX TO FT4; " Future    Essential hypertension    Health care maintenance          Please note that this dictation was created using voice recognition software. I have made every reasonable attempt to correct obvious errors, but I expect that there are errors of grammar and possibly content that I did not discover before finalizing the note.

## 2017-10-20 NOTE — ASSESSMENT & PLAN NOTE
This is a chronic condition, stable and usually well controlled on Synthroid 50 µg daily. His most recent TSH with reflexive T4 were within normal limits. He does tell me that he has stopped the medication about 2 weeks ago due to a sensation of fullness in bilateral eyes as well as mild orbital swelling. He stopped medication and noticed that the symptoms nearly resolved. He states this has happened to him before, but this was due to him taking the generic form of the medication. He has been on brand name.   He would like to stay off the medication for the next 6 weeks to 2 months to see how he does off of it. He is wondering if this can be reversed if he is no longer exposed to the chemicals that have been thought to cause his hypothyroidism. He states he has been exposed to a wood preservative that has been known to cause hypothyroidism. I will discuss with one of our endocrinologist and get back to him.  He is going to have labs done in 2 months, and follow-up with me in 3 months. He is to restart medication should symptoms of hypothyroidism returned.

## 2017-10-24 NOTE — TELEPHONE ENCOUNTER
Patient notified and agrees. He stated that [place] is [place] out [place] currently but will have labs drawn when he returns.

## 2018-01-17 DIAGNOSIS — E03.9 HYPOTHYROIDISM (ACQUIRED): ICD-10-CM

## 2018-01-17 RX ORDER — LEVOTHYROXINE SODIUM 0.03 MG/1
25 TABLET ORAL
Qty: 90 TAB | Refills: 1 | Status: SHIPPED | OUTPATIENT
Start: 2018-01-17

## 2018-01-17 NOTE — TELEPHONE ENCOUNTER
This will be fine, 25 mcg has been called in, please make sure he has labs done before he sees me again.

## 2018-01-17 NOTE — TELEPHONE ENCOUNTER
"Patient called and stated that he went off of his medication-Synthroid. He stated that this \"was not a good idea.\" He is requesting a refill of this medication but he would like to take 25mcg instead of 50mcg. He stated that he is currently in [place], but will call for an appt when he returns.   "

## 2018-06-01 ENCOUNTER — OFFICE VISIT (OUTPATIENT)
Dept: URGENT CARE | Facility: PHYSICIAN GROUP | Age: 48
End: 2018-06-01
Payer: COMMERCIAL

## 2018-06-01 VITALS
BODY MASS INDEX: 37.09 KG/M2 | TEMPERATURE: 99.7 F | HEART RATE: 106 BPM | RESPIRATION RATE: 16 BRPM | WEIGHT: 289 LBS | OXYGEN SATURATION: 96 % | DIASTOLIC BLOOD PRESSURE: 96 MMHG | HEIGHT: 74 IN | SYSTOLIC BLOOD PRESSURE: 152 MMHG

## 2018-06-01 DIAGNOSIS — B96.89 ACUTE BACTERIAL SINUSITIS: ICD-10-CM

## 2018-06-01 DIAGNOSIS — E66.9 OBESITY (BMI 35.0-39.9 WITHOUT COMORBIDITY): ICD-10-CM

## 2018-06-01 DIAGNOSIS — J01.90 ACUTE BACTERIAL SINUSITIS: ICD-10-CM

## 2018-06-01 PROCEDURE — 99214 OFFICE O/P EST MOD 30 MIN: CPT | Performed by: FAMILY MEDICINE

## 2018-06-01 RX ORDER — PREDNISONE 20 MG/1
TABLET ORAL
Qty: 20 TAB | Refills: 0 | Status: SHIPPED | OUTPATIENT
Start: 2018-06-01 | End: 2018-07-28

## 2018-06-01 RX ORDER — AZITHROMYCIN 250 MG/1
TABLET, FILM COATED ORAL
Qty: 6 TAB | Refills: 1 | Status: SHIPPED | OUTPATIENT
Start: 2018-06-01 | End: 2018-07-28

## 2018-06-02 NOTE — PROGRESS NOTES
Chief Complaint:    Chief Complaint   Patient presents with   • Sinus Problem       History of Present Illness:    This is a new problem. For 6 weeks, he has severe left nasal congestion and a lot of purulent mucus from nose. He has long history of recurrent sinus infections with history of sinus surgery. Doxycycline worked for sinus infection 8/17/16, but was hard on his stomach. Z-leander and Prednisone have been helpful for similar symptoms in past.      Review of Systems:    Constitutional: Negative for fever, chills, and diaphoresis.   Eyes: Negative for change in vision, photophobia, pain, redness, and discharge.  ENT: See HPI.   Respiratory: Negative for cough, hemoptysis, sputum production, shortness of breath, wheezing, and stridor.    Cardiovascular: Negative for chest pain, palpitations, orthopnea, claudication, leg swelling, and PND.   Gastrointestinal: Negative for abdominal pain, nausea, vomiting, diarrhea, constipation, blood in stool, and melena.   Genitourinary: Negative for dysuria, urinary urgency, urinary frequency, hematuria, and flank pain.   Musculoskeletal: Negative for myalgias, joint pain, neck pain, and back pain.   Skin: Negative for rash and itching.   Neurological: Negative for dizziness, tingling, tremors, sensory change, speech change, focal weakness, seizures, loss of consciousness, and headaches.   Endo: Negative for polydipsia.   Heme: Does not bruise/bleed easily.   Psychiatric/Behavioral: Negative for depression, suicidal ideas, hallucinations, memory loss and substance abuse. The patient is not nervous/anxious and does not have insomnia.        Past Medical History:    Past Medical History:   Diagnosis Date   • Chronic sinusitis    • Hypertension    • Hypothyroidism (acquired)      Past Surgical History:    Past Surgical History:   Procedure Laterality Date   • NASAL RECONSTRUCTION  2013   • RHINOPLASTY  2000   • HAND SURGERY  1997, 1998     Social History:    Social History  "    Social History   • Marital status:      Spouse name: N/A   • Number of children: N/A   • Years of education: N/A     Occupational History   • Not on file.     Social History Main Topics   • Smoking status: Never Smoker   • Smokeless tobacco: Current User      Comment: only smoked for 1 year   • Alcohol use 0.0 oz/week      Comment: rare--once every 3 months   • Drug use: No   • Sexual activity: Yes     Partners: Female     Other Topics Concern   • Not on file     Social History Narrative   • No narrative on file     Family History:    Family History   Problem Relation Age of Onset   • Diabetes Father    • Hypertension Father    • Kidney stones Father      Gout as well   • Thyroid Mother    • Hypertension Mother      Medications:    Current Outpatient Prescriptions on File Prior to Visit   Medication Sig Dispense Refill   • Fexofenadine HCl (ALLEGRA PO) Take  by mouth.     • losartan (COZAAR) 25 MG Tab Take 1 Tab by mouth every day. 90 Tab 3   • albuterol 108 (90 BASE) MCG/ACT Aero Soln inhalation aerosol Inhale 2 Puffs by mouth every 6 hours as needed for Shortness of Breath. 8.5 g 3   • fluticasone (FLONASE) 50 MCG/ACT nasal spray Spray 1 Spray in nose 2 times a day. 1 Bottle 0   • levothyroxine (SYNTHROID) 25 MCG Tab Take 1 Tab by mouth Every morning on an empty stomach. 90 Tab 1     No current facility-administered medications on file prior to visit.      Allergies:    Allergies   Allergen Reactions   • Pcn [Penicillins] Anaphylaxis       Vitals:    Vitals:    06/01/18 1701   BP: 152/96   Pulse: (!) 106   Resp: 16   Temp: 37.6 °C (99.7 °F)   SpO2: 96%   Weight: (!) 131.1 kg (289 lb)   Height: 1.88 m (6' 2\")       Physical Exam:    Constitutional: Vital signs reviewed. Appears well-developed and well-nourished. No acute distress.   Eyes: Sclera white, conjunctivae clear.  ENT: Bilateral nasal congestion (L>R) and erythematous nasal mucosa. External ears normal. External auditory canals normal without " discharge. TMs translucent and non-bulging. Hearing normal. Lips/teeth are normal. Oral mucosa pink and moist. Posterior pharynx: WNL.  Neck: Neck supple.   Cardiovascular: Regular rate and rhythm. No murmur.  Pulmonary/Chest: Respirations non-labored. Clear to auscultation bilaterally.  Lymph: Cervical nodes without tenderness or enlargement.  Musculoskeletal: Normal gait. Normal range of motion. No muscular atrophy or weakness.  Neurological: Alert and oriented to person, place, and time. Muscle tone normal. Coordination normal.   Skin: No rashes or lesions. Warm, dry, normal turgor.  Psychiatric: Normal mood and affect. Behavior is normal. Judgment and thought content normal.       Assessment / Plan:    1. Acute bacterial sinusitis  - azithromycin (ZITHROMAX) 250 MG Tab; 2 TABS ON DAY 1, 1 TAB ON DAYS 2-5.  Dispense: 6 Tab; Refill: 1  - predniSONE (DELTASONE) 20 MG Tab; 1 OR 2 TABS A DAY ONLY IF NEEDED FOR NASAL CONGESTION. TAKE WITH FOOD.  Dispense: 20 Tab; Refill: 0    2. Obesity (BMI 35.0-39.9 without comorbidity)  - Patient identified as having weight management issue.  Appropriate orders and counseling given.      Discussed with him DDX and management options.    Agreeable to medications prescribed.    Follow-up with PCP or urgent care if getting worse or not better with above.

## 2018-07-28 ENCOUNTER — OFFICE VISIT (OUTPATIENT)
Dept: URGENT CARE | Facility: PHYSICIAN GROUP | Age: 48
End: 2018-07-28
Payer: COMMERCIAL

## 2018-07-28 VITALS
WEIGHT: 291.4 LBS | BODY MASS INDEX: 37.4 KG/M2 | RESPIRATION RATE: 14 BRPM | TEMPERATURE: 98.4 F | HEIGHT: 74 IN | HEART RATE: 82 BPM | SYSTOLIC BLOOD PRESSURE: 132 MMHG | DIASTOLIC BLOOD PRESSURE: 70 MMHG | OXYGEN SATURATION: 98 %

## 2018-07-28 DIAGNOSIS — J01.00 ACUTE NON-RECURRENT MAXILLARY SINUSITIS: ICD-10-CM

## 2018-07-28 DIAGNOSIS — J30.89 NON-SEASONAL ALLERGIC RHINITIS, UNSPECIFIED TRIGGER: ICD-10-CM

## 2018-07-28 PROCEDURE — 99214 OFFICE O/P EST MOD 30 MIN: CPT | Performed by: PHYSICIAN ASSISTANT

## 2018-07-28 RX ORDER — TRIAMCINOLONE ACETONIDE 40 MG/ML
40 INJECTION, SUSPENSION INTRA-ARTICULAR; INTRAMUSCULAR ONCE
Status: COMPLETED | OUTPATIENT
Start: 2018-07-28 | End: 2018-07-28

## 2018-07-28 RX ORDER — DOXYCYCLINE HYCLATE 100 MG
100 TABLET ORAL 2 TIMES DAILY
Qty: 10 TAB | Refills: 0 | Status: SHIPPED | OUTPATIENT
Start: 2018-07-28

## 2018-07-28 RX ADMIN — TRIAMCINOLONE ACETONIDE 40 MG: 40 INJECTION, SUSPENSION INTRA-ARTICULAR; INTRAMUSCULAR at 11:50

## 2018-07-28 ASSESSMENT — PAIN SCALES - GENERAL: PAINLEVEL: NO PAIN

## 2018-07-28 NOTE — PROGRESS NOTES
Chief Complaint   Patient presents with   • Allergic Rhinitis     x 2 weeks / requesting prednizone         HISTORY OF PRESENT ILLNESS: Patient is a 48 y.o. male who presents today for 2 weeks of worsening diffuse sinus pressure.   Patient has not been experiencing chest tightness or asthma symptoms and has not had to use his rescue inhaler despite the allergens and smoke.  No fevers but states he had some last week when he was being treated for sinus infection. He had left maxillary sinus tenderness and was seen by physician who put him on a Zithromax pack. Notes he is doing better from this.  He does take Allegra daily and does use Flonase.  He feels that his allergies are not responding well to this and requests prn Prednisone. Notes he used to get steroid shot once a year for allergies but has not gotten one in some time.  These used to work very well.      Patient Active Problem List    Diagnosis Date Noted   • Obesity (BMI 35.0-39.9 without comorbidity) ([facility]) 06/01/2018   • Skin lesion 04/21/2017   • Health care maintenance 04/21/2017   • Elevated glucose 04/21/2017   • Allergic rhinitis due to pollen 09/23/2016   • Chronic sinusitis 09/23/2016   • Shortness of breath 09/23/2016   • Hypothyroidism (acquired) 09/23/2016   • Essential hypertension 09/23/2016       Allergies:Pcn [penicillins]    Current Outpatient Prescriptions Ordered in [facility]   Medication Sig Dispense Refill   • levothyroxine (SYNTHROID) 25 MCG Tab Take 1 Tab by mouth Every morning on an empty stomach. 90 Tab 1   • Fexofenadine HCl (ALLEGRA PO) Take  by mouth.     • losartan (COZAAR) 25 MG Tab Take 1 Tab by mouth every day. 90 Tab 3   • albuterol 108 (90 BASE) MCG/ACT Aero Soln inhalation aerosol Inhale 2 Puffs by mouth every 6 hours as needed for Shortness of Breath. 8.5 g 3   • fluticasone (FLONASE) 50 MCG/ACT nasal spray Spray 1 Spray in nose 2 times a day. 1 Bottle 0     No current Epic-ordered facility-administered medications on file.   "      Past Medical History:   Diagnosis Date   • Chronic sinusitis    • Hypertension    • Hypothyroidism (acquired)        Social History   Substance Use Topics   • Smoking status: Never Smoker   • Smokeless tobacco: Current User      Comment: only smoked for 1 year   • Alcohol use 0.0 oz/week      Comment: rare--once every 3 months       Family Status   Relation Status   • Fa (Not Specified)   • Mo (Not Specified)     Family History   Problem Relation Age of Onset   • Diabetes Father    • Hypertension Father    • Kidney stones Father         Gout as well   • Thyroid Mother    • Hypertension Mother        ROS:  Review of Systems   Constitutional: Negative for fever, chills, weight loss and malaise/fatigue.   HENT: SEE HPI  Eyes: Negative for blurred vision.   Respiratory: Negative for cough, sputum production, shortness of breath and wheezing.    Cardiovascular: Negative for chest pain, palpitations, orthopnea and leg swelling.   Gastrointestinal: Negative for heartburn, nausea, vomiting and abdominal pain.   Genitourinary: Negative for dysuria, urgency and frequency.     Exam:  Blood pressure 132/70, pulse 82, temperature 36.9 °C (98.4 °F), resp. rate 14, height 1.88 m (6' 2\"), weight (!) 132.2 kg (291 lb 6.4 oz), SpO2 98 %.  General:  Well nourished, well developed male in NAD  Eyes: PERRLA, EOM within normal limits, no conjunctival injection, no scleral icterus, visual fields and acuity grossly intact.  Ears: Normal shape and symmetry, no tenderness, no discharge. External canals are without any significant edema or erythema. Tympanic membranes are without any inflammation, no effusion. Gross auditory acuity is intact  Nose: Symmetrical, sinuses without tenderness, boggy/pale turbinates, clear rhinorrhea.   Mouth: reasonable hygiene, no erythema exudates or tonsillar enlargement.  Neck: no masses, range of motion within normal limits, no tracheal deviation. No lymphadenopathy  Pulmonary: Normal respiratory " effort, no wheezes, crackles, or rhonchi.  Cardiovascular: regular rate and rhythm without murmurs, rubs, or gallops.  Skin: No visible rashes or lesion. Warm, pink, dry.   Extremities: no clubbing, cyanosis, or edema.  Neuro: A&O x 3. Speech normal/clear.  Normal gait.         Assessment/Plan:  1. Non-seasonal allergic rhinitis, unspecified trigger  triamcinolone acetonide (KENALOG-40) injection 40 mg   2. Acute non-recurrent maxillary sinusitis  doxycycline (VIBRAMYCIN) 100 MG Tab       -patient will continue with full 10 days of abx and finish with Doxy for sinus coverage.   -benign exam.   Patient given Kenalog shot in clinic which he tolerated well.   -discussed risks/benefits of steroids, patient expressed understanding and states he wishes to proceed with shot.   -will continue Allegra and saline irrigation of sinuses  -recommend PCP follow up.        Supportive care, differential diagnoses, and indications for immediate follow-up discussed with patient.   Pathogenesis of diagnosis discussed including typical length and natural progression.   Instructed to return to clinic or nearest emergency department for any change in condition, further concerns, or worsening of symptoms.  Patient states understanding of the plan of care and discharge instructions.  Instructed to make an appointment, for follow up, with their primary care provider.      Lisa Whitfield P.A.-C.